# Patient Record
Sex: FEMALE | Race: WHITE | NOT HISPANIC OR LATINO | URBAN - NONMETROPOLITAN AREA
[De-identification: names, ages, dates, MRNs, and addresses within clinical notes are randomized per-mention and may not be internally consistent; named-entity substitution may affect disease eponyms.]

---

## 2022-05-19 ENCOUNTER — OFFICE (OUTPATIENT)
Dept: URBAN - NONMETROPOLITAN AREA CLINIC 5 | Facility: CLINIC | Age: 77
End: 2022-05-19

## 2022-05-19 VITALS
SYSTOLIC BLOOD PRESSURE: 148 MMHG | RESPIRATION RATE: 20 BRPM | WEIGHT: 178 LBS | HEART RATE: 77 BPM | DIASTOLIC BLOOD PRESSURE: 73 MMHG | HEIGHT: 65 IN

## 2022-05-19 DIAGNOSIS — D64.9 ANEMIA, UNSPECIFIED: ICD-10-CM

## 2022-05-19 DIAGNOSIS — R19.7 DIARRHEA, UNSPECIFIED: ICD-10-CM

## 2022-05-19 DIAGNOSIS — R12 HEARTBURN: ICD-10-CM

## 2022-05-19 PROCEDURE — 99204 OFFICE O/P NEW MOD 45 MIN: CPT | Performed by: INTERNAL MEDICINE

## 2022-05-19 RX ORDER — FAMOTIDINE 40 MG/1
TABLET, FILM COATED ORAL
Qty: 60 | Refills: 11 | Status: ACTIVE
Start: 2022-05-19

## 2022-05-19 NOTE — SERVICEHPINOTES
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  Patient reports she was experiencing weakness and fatigue and was evaluated on 05/10 which time she was found have a hemoglobin of 5.4 and MCV of 72.  She was transfused 3 units of PRBCs.  She was also started on a p.o. iron supplement at that time.  She reports longstanding anemia but denies previously requiring blood transfusions.  She did have a normal colonoscopy in 2018. She reports colonoscopy prior to that one was also normal.  She does endorse a longstanding history of diarrhea and will have 7-8 "squirts" throughout the day. She reports on prior colonoscopy she had random colon biopsies as well as stool studies which were unremarkable for cause.  She does endorse significant acid reflux as well.  She is taking her omeprazole at night.  She often will forget to take her meds and will wake in the middle the night around 3:00 a.m. in take it.  She denies undergoing an EGD in quite some time.  Counseled on the need for EGD and colonoscopy.  She is not willing to pursue another colonoscopy at this time.  She is amenable to proceeding with EGD.

## 2022-05-19 NOTE — SERVICENOTES
Given patient's significant microcytic anemia will plan for EGD for further assessment.  Counseled on need for colonoscopy but she does not desire to pursue that at this time.  If EGD is unremarkable will arrange for repeat colonoscopy and capsule endoscopy placement at the same time.  Patient does endorse a longstanding history of diarrhea and reports previously having stool studies and biopsies which were negative.  If we do repeat colonoscopy will plan to obtain biopsies and send a stool aspirate.  In regards patient's acid reflux counseled her on timing of her PPI and to take her omeprazole 30-45 minutes prior to breakfast.  Will also add Pepcid at bedtime.  Will plan to see back in clinic in 4 months.

## 2022-06-24 ENCOUNTER — ON CAMPUS - OUTPATIENT (OUTPATIENT)
Dept: URBAN - NONMETROPOLITAN AREA HOSPITAL 28 | Facility: HOSPITAL | Age: 77
End: 2022-06-24

## 2022-06-24 DIAGNOSIS — K31.811 ANGIODYSPLASIA OF STOMACH AND DUODENUM WITH BLEEDING: ICD-10-CM

## 2022-06-24 DIAGNOSIS — K92.1 MELENA: ICD-10-CM

## 2022-06-24 DIAGNOSIS — D62 ACUTE POSTHEMORRHAGIC ANEMIA: ICD-10-CM

## 2022-06-24 DIAGNOSIS — K21.9 GASTRO-ESOPHAGEAL REFLUX DISEASE WITHOUT ESOPHAGITIS: ICD-10-CM

## 2022-06-24 DIAGNOSIS — K22.2 ESOPHAGEAL OBSTRUCTION: ICD-10-CM

## 2022-06-24 DIAGNOSIS — K44.9 DIAPHRAGMATIC HERNIA WITHOUT OBSTRUCTION OR GANGRENE: ICD-10-CM

## 2022-06-24 PROCEDURE — 43255 EGD CONTROL BLEEDING ANY: CPT | Performed by: INTERNAL MEDICINE

## 2022-06-24 PROCEDURE — 43450 DILATE ESOPHAGUS 1/MULT PASS: CPT | Performed by: INTERNAL MEDICINE

## 2022-11-15 ENCOUNTER — OFFICE (OUTPATIENT)
Dept: URBAN - NONMETROPOLITAN AREA CLINIC 5 | Facility: CLINIC | Age: 77
End: 2022-11-15

## 2022-11-15 VITALS
SYSTOLIC BLOOD PRESSURE: 125 MMHG | HEIGHT: 65 IN | HEART RATE: 101 BPM | DIASTOLIC BLOOD PRESSURE: 66 MMHG | RESPIRATION RATE: 20 BRPM | WEIGHT: 170 LBS

## 2022-11-15 DIAGNOSIS — Z87.19 PERSONAL HISTORY OF OTHER DISEASES OF THE DIGESTIVE SYSTEM: ICD-10-CM

## 2022-11-15 DIAGNOSIS — D64.9 ANEMIA, UNSPECIFIED: ICD-10-CM

## 2022-11-15 DIAGNOSIS — R53.83 OTHER FATIGUE: ICD-10-CM

## 2022-11-15 PROCEDURE — 99214 OFFICE O/P EST MOD 30 MIN: CPT | Performed by: INTERNAL MEDICINE

## 2022-11-15 NOTE — SERVICENOTES
Given patient's recurrent fatigue, recent 2 point drop in hemoglobin, weakness, and falls will plan for repeat blood work and iron studies as above.  If with evidence of iron deficiency anemia will arrange for iron infusions.  Will plan for repeat EGD at [facility] in the coming weeks.

## 2022-11-15 NOTE — SERVICEHPINOTES
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  Patient reports she was experiencing weakness and fatigue and was evaluated on 05/10/2022 which time she was found have a hemoglobin of 5.4 and MCV of 72.  She was transfused 3 units of PRBCs.  She was also started on a p.o. iron supplement at that time.  She reports longstanding anemia but denies previously requiring blood transfusions.  She did have a normal colonoscopy in 2018. She reports colonoscopy prior to that one was also normal.  She does endorse a longstanding history of diarrhea and will have 7-8 "squirts" throughout the day. She reports on prior colonoscopy she had random colon biopsies as well as stool studies which were unremarkable for cause. 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 Patient presents for follow-up today.   Patient reports today that she has been seeing "black specks" in her stool again.  After the previous EGD the appearance of these black specks in her stool had stopped.  After the prior EGD she reports she was feeling quite well for some time before she started to become fatigued again.  She reports most recent blood counts revealed a 2 point drop in her hemoglobin.  She denies any NSAID use.  She remains on a baby aspirin daily.  She does take a multivitamin containing iron as well as a flintstone vitamin with iron daily.  She has never had iron infusions.

## 2022-12-15 ENCOUNTER — ON CAMPUS - OUTPATIENT (OUTPATIENT)
Dept: URBAN - NONMETROPOLITAN AREA HOSPITAL 28 | Facility: HOSPITAL | Age: 77
End: 2022-12-15

## 2022-12-15 DIAGNOSIS — K31.819 ANGIODYSPLASIA OF STOMACH AND DUODENUM WITHOUT BLEEDING: ICD-10-CM

## 2022-12-15 DIAGNOSIS — K29.70 GASTRITIS, UNSPECIFIED, WITHOUT BLEEDING: ICD-10-CM

## 2022-12-15 DIAGNOSIS — R13.19 OTHER DYSPHAGIA: ICD-10-CM

## 2022-12-15 DIAGNOSIS — K44.9 DIAPHRAGMATIC HERNIA WITHOUT OBSTRUCTION OR GANGRENE: ICD-10-CM

## 2022-12-15 DIAGNOSIS — K22.2 ESOPHAGEAL OBSTRUCTION: ICD-10-CM

## 2022-12-15 PROCEDURE — 43270 EGD LESION ABLATION: CPT | Performed by: INTERNAL MEDICINE

## 2023-03-24 ENCOUNTER — OFFICE (OUTPATIENT)
Dept: URBAN - NONMETROPOLITAN AREA CLINIC 5 | Facility: CLINIC | Age: 78
End: 2023-03-24

## 2023-03-24 VITALS
HEART RATE: 74 BPM | HEIGHT: 65 IN | RESPIRATION RATE: 20 BRPM | DIASTOLIC BLOOD PRESSURE: 60 MMHG | SYSTOLIC BLOOD PRESSURE: 151 MMHG | WEIGHT: 176 LBS

## 2023-03-24 DIAGNOSIS — D64.9 ANEMIA, UNSPECIFIED: ICD-10-CM

## 2023-03-24 DIAGNOSIS — N18.9 CHRONIC KIDNEY DISEASE, UNSPECIFIED: ICD-10-CM

## 2023-03-24 DIAGNOSIS — R53.83 OTHER FATIGUE: ICD-10-CM

## 2023-03-24 DIAGNOSIS — R19.7 DIARRHEA, UNSPECIFIED: ICD-10-CM

## 2023-03-24 PROCEDURE — 99213 OFFICE O/P EST LOW 20 MIN: CPT | Performed by: INTERNAL MEDICINE

## 2023-07-25 ENCOUNTER — OFFICE (OUTPATIENT)
Dept: URBAN - NONMETROPOLITAN AREA CLINIC 5 | Facility: CLINIC | Age: 78
End: 2023-07-25

## 2023-07-25 VITALS
DIASTOLIC BLOOD PRESSURE: 68 MMHG | SYSTOLIC BLOOD PRESSURE: 167 MMHG | WEIGHT: 178 LBS | HEART RATE: 71 BPM | RESPIRATION RATE: 20 BRPM | HEIGHT: 65 IN

## 2023-07-25 DIAGNOSIS — N18.9 CHRONIC KIDNEY DISEASE, UNSPECIFIED: ICD-10-CM

## 2023-07-25 DIAGNOSIS — D64.9 ANEMIA, UNSPECIFIED: ICD-10-CM

## 2023-07-25 DIAGNOSIS — R53.83 OTHER FATIGUE: ICD-10-CM

## 2023-07-25 PROCEDURE — 99214 OFFICE O/P EST MOD 30 MIN: CPT | Performed by: INTERNAL MEDICINE

## 2023-07-25 NOTE — SERVICENOTES
Patient had recent blood work with her PCP and will track this down for review.  Counseled her to continue on the slow Fe for now.  She has follow-up with Nephrology next month as scheduled.  She can attempt Benefiber for regularity of the bowels.  Otherwise will plan to see back in clinic in 1 month.  Counseled again on the need for repeat colonoscopy, however, she continues to refuse a colonoscopy.

## 2023-07-25 NOTE — SERVICEHPINOTES
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  Patient reports she was experiencing weakness and fatigue and was evaluated on 05/10/2022  at which time she was found have a hemoglobin of 5.4 and MCV of 72.  She was transfused 3 units of PRBCs.  She was also started on a p.o. iron supplement at that time.  She reported longstanding anemia but denied previously requiring blood transfusions.  She did have a normal colonoscopy in 2018. She reported colonoscopy prior to that one was also normal.  She endorsed longstanding diarrhea and would have 7-8 "squirts" throughout the day. She reported on prior colonoscopy she had random colon biopsies as well as stool studies which were unremarkable for a cause. 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 Patient at prior follow-up reported that she had been seeing "black specks" in her stool again.  After the previous EGD the appearance of these black specks in her stool had stopped.  After the prior EGD she reported she was feeling quite well for some time before she started to become fatigued again.  She reported most recent blood counts revealed a 2 point drop in her hemoglobin.  She denied any NSAID use.  She remained on a baby aspirin daily. After prior visit patient had blood work on 11/15/2022 which showed a WBC 7.8, hemoglobin 12.3, platelets 199, iron 53, and ferritin of 30. Given patient's reported black specks in her stool repeat EGD was performed on 12/15/2022 which revealed normal appearing mucosa the esophagus, small hiatal hernia, and Schatzki's ring which was dilated to 16 mm with Savary dilator. Was mild gastritis in the stomach. A small AVM in the 2nd portion the duodenum which APC was utilized for cauterization/destruction. Patient was counseled to avoid NSAIDs. Patient presents for follow-up today.   patient reports she is doing well today.  She had a repeat hemoglobin check in January which was 13. .  She does report she continues with episodes of fatigue.  She also reports chronic shortness of breath since she had her subclavian stent placed in 2012 at which time her breast cancer was diagnosed.  She was on a ventilator that time and has been short of breath since.  She does report a component of COPD.  She does have lower extremity edema.  She has never seen a nephrologist and her GFR has been approximately 30 for greater than 1 year.  Patient does continue to have GI side effects from the iron.   She does report some depression as well.  She can sleep up to 24 hours in the day.
br
br      Patient reports today she is doing well.  She had recent blood work which revealed a hemoglobin of 13.  She reports this is the 1st time she has been over 10 in quite some time.  She continues on the slow Fe.  She continues with renal issues.  She is scheduled to see Nephrology next month.  It has taken her 4 months to get an appointment.  She does report ongoing episodes of intermittent diarrhea.  She has never attempted Benefiber.  She denies any bright red blood per rectum, melena, abdominal pain, rashes, joint pains, etc..

## 2024-08-07 ENCOUNTER — OFFICE (OUTPATIENT)
Dept: URBAN - NONMETROPOLITAN AREA CLINIC 5 | Facility: CLINIC | Age: 79
End: 2024-08-07
Payer: MEDICARE

## 2024-08-07 VITALS
WEIGHT: 179 LBS | HEIGHT: 65 IN | DIASTOLIC BLOOD PRESSURE: 71 MMHG | SYSTOLIC BLOOD PRESSURE: 131 MMHG | HEART RATE: 84 BPM | RESPIRATION RATE: 18 BRPM

## 2024-08-07 DIAGNOSIS — R15.9 FULL INCONTINENCE OF FECES: ICD-10-CM

## 2024-08-07 DIAGNOSIS — N18.9 CHRONIC KIDNEY DISEASE, UNSPECIFIED: ICD-10-CM

## 2024-08-07 DIAGNOSIS — K58.0 IRRITABLE BOWEL SYNDROME WITH DIARRHEA: ICD-10-CM

## 2024-08-07 DIAGNOSIS — R53.83 OTHER FATIGUE: ICD-10-CM

## 2024-08-07 PROCEDURE — 99214 OFFICE O/P EST MOD 30 MIN: CPT | Performed by: INTERNAL MEDICINE

## 2024-08-07 NOTE — SERVICEHPINOTES
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  Patient   Establish care in May of 2022 for weakness and fatigue was found to have hemoglobin of 5.4 with an MCV of 72.  She had been transfused several units blood.  She reported longstanding anemia.  She did have a normal colonoscopy in 2018. She reported colonoscopy prior to that one was also normal.  She endorsed longstanding diarrhea and would have 7-8 "squirts" throughout the day. She reported on prior colonoscopy she had random colon biopsies as well as stool studies which were unremarkable for a cause. 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 Patient's reported black specks in her stool and repeat EGD was performed on 12/15/2022 which revealed normal appearing mucosa the esophagus, small hiatal hernia, and Schatzki's ring which was dilated to 16 mm with Savary dilator. There was mild gastritis in the stomach. A small AVM in the 2nd portion the duodenum for which APC was utilized for cauterization/destruction. Patient was counseled to avoid NSAIDs. Patient at prior follow-up reported she was doing well.  She had had recent blood work revealed a hemoglobin of 13 and she reported been over 10 for quite some time.   She reported ongoing intermittent diarrhea.  She hd never attempted a fiber supplement.  She presents now for follow-up..   Patient reports today she continues with episodes of fecal incontinence and constant diarrhea.  She is scared to take the fiber as she is deathly afraid of becoming constipated again as has happened twice in her life and she does not want it to happen again.  She was given Xifaxan by her PCP but was scared to take it until she talked to me.  Her siblings have similar symptoms and her brother recommended taking smarter greens which appears to be a probiotic and fiber supplement.  She has yet to take this.  She does report some urinary incontinence as well.  She has never attempted Kegel exercises.

## 2024-08-07 NOTE — SERVICENOTES
given patient's fecal incontinence and diarrhea counseled her extensively on the need to be on a soluble fiber. She can start with a low dose and slowly up titrate as tolerated.  She can also attempt Kegel exercises for the fecal incontinence.  If she knows she is going to travel or does not see  improvement with the fiber she can attempt Imodium with up to 16 mg daily.  Will also give a course of Xifaxan for IBS - D.  Will plan to see back in clinic in 4 months.

## 2024-12-12 ENCOUNTER — OFFICE (OUTPATIENT)
Dept: URBAN - NONMETROPOLITAN AREA CLINIC 5 | Facility: CLINIC | Age: 79
End: 2024-12-12
Payer: COMMERCIAL

## 2024-12-12 VITALS
DIASTOLIC BLOOD PRESSURE: 69 MMHG | HEIGHT: 65 IN | RESPIRATION RATE: 18 BRPM | HEART RATE: 80 BPM | SYSTOLIC BLOOD PRESSURE: 144 MMHG | WEIGHT: 176 LBS

## 2024-12-12 DIAGNOSIS — R19.7 DIARRHEA, UNSPECIFIED: ICD-10-CM

## 2024-12-12 DIAGNOSIS — R15.9 FULL INCONTINENCE OF FECES: ICD-10-CM

## 2024-12-12 DIAGNOSIS — N18.9 CHRONIC KIDNEY DISEASE, UNSPECIFIED: ICD-10-CM

## 2024-12-12 PROCEDURE — 99213 OFFICE O/P EST LOW 20 MIN: CPT | Performed by: INTERNAL MEDICINE

## 2024-12-12 NOTE — SERVICENOTES
Patient continues with bowel and bladder incontinence.  Will place referral to Dr. Licea to evaluate for possible stimulator and to assess for recurrent UTIs.  Counseled again on continuing with the fiber and can start with capsules and slowly up titrate.  She can attempt Kegel exercises.  Will plan to see back in clinic in 6 months.

## 2024-12-12 NOTE — SERVICEHPINOTES
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  Patient   Establish care in May of 2022 for weakness and fatigue was found to have hemoglobin of 5.4 with an MCV of 72.  She had been transfused several units blood.  She reported longstanding anemia.  She did have a normal colonoscopy in 2018. She reported colonoscopy prior to that one was also normal.  She endorsed longstanding diarrhea and would have 7-8 "squirts" throughout the day. She reported on prior colonoscopy she had random colon biopsies as well as stool studies which were unremarkable for a cause. 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 Patient's reported black specks in her stool and repeat EGD was performed on 12/15/2022 which revealed normal appearing mucosa the esophagus, small hiatal hernia, and Schatzki's ring which was dilated to 16 mm with Savary dilator. There was mild gastritis in the stomach. A small AVM in the 2nd portion the duodenum for which APC was utilized for cauterization/destruction. Patient was counseled to avoid NSAIDs. Patient at prior follow-up reported she was doing well.  She had had recent blood work revealed a hemoglobin of 13 and she reported been over 10 for quite some time.   She reported ongoing intermittent diarrhea.  She hd never attempted a fiber supplement.  She presents now for follow-up..   Patient reports today she continues with episodes of fecal incontinence and constant diarrhea.  She is scared to take the fiber as she is deathly afraid of becoming constipated again as has happened twice in her life and she does not want it to happen again.  She was given Xifaxan by her PCP but was scared to take it until she talked to me.  Her siblings have similar symptoms and her brother recommended taking smarter greens which appears to be a probiotic and fiber supplement.  She has yet to take this.  She does report some urinary incontinence as well.  She has never attempted Kegel exercises.
br  
br Patient presents follow-up today.  She reports that she has been doing well.  She did start the fiber which has significantly helped.  She can only take it for 1-2 days but then she will be backed up and has to wait until her stools are loose again.  If she takes it every day she will be constipated.  She did not stick with the Kegel exercises.  She continues with bowel and bladder incontinence.  She was referred to Urology for recurrent UTIs but has not had an appoint with them.  She is requesting a another referral today.  She reports otherwise she is doing well denies any complaints.

## 2025-08-26 ENCOUNTER — OFFICE (OUTPATIENT)
Dept: URBAN - NONMETROPOLITAN AREA CLINIC 5 | Facility: CLINIC | Age: 80
End: 2025-08-26
Payer: MEDICARE

## 2025-08-26 VITALS
DIASTOLIC BLOOD PRESSURE: 61 MMHG | WEIGHT: 171 LBS | SYSTOLIC BLOOD PRESSURE: 171 MMHG | HEART RATE: 80 BPM | RESPIRATION RATE: 20 BRPM | HEIGHT: 65 IN

## 2025-08-26 DIAGNOSIS — N18.9 CHRONIC KIDNEY DISEASE, UNSPECIFIED: ICD-10-CM

## 2025-08-26 DIAGNOSIS — R19.7 DIARRHEA, UNSPECIFIED: ICD-10-CM

## 2025-08-26 PROCEDURE — 99213 OFFICE O/P EST LOW 20 MIN: CPT | Performed by: INTERNAL MEDICINE
